# Patient Record
Sex: MALE | Race: BLACK OR AFRICAN AMERICAN | NOT HISPANIC OR LATINO | Employment: FULL TIME | URBAN - METROPOLITAN AREA
[De-identification: names, ages, dates, MRNs, and addresses within clinical notes are randomized per-mention and may not be internally consistent; named-entity substitution may affect disease eponyms.]

---

## 2017-01-30 PROBLEM — M25.562 CHRONIC PAIN OF LEFT KNEE: Status: ACTIVE | Noted: 2017-01-30

## 2017-01-30 PROBLEM — M25.552 PAIN OF LEFT HIP JOINT: Status: ACTIVE | Noted: 2017-01-30

## 2017-01-30 PROBLEM — M54.40 CHRONIC LOW BACK PAIN WITH SCIATICA: Status: ACTIVE | Noted: 2017-01-30

## 2017-01-30 PROBLEM — G89.29 CHRONIC PAIN OF LEFT KNEE: Status: ACTIVE | Noted: 2017-01-30

## 2017-01-30 PROBLEM — D86.9 SARCOIDOSIS: Status: ACTIVE | Noted: 2017-01-30

## 2017-01-30 PROBLEM — G89.29 CHRONIC LOW BACK PAIN WITH SCIATICA: Status: ACTIVE | Noted: 2017-01-30

## 2017-03-30 PROBLEM — M25.511 CHRONIC RIGHT SHOULDER PAIN: Status: ACTIVE | Noted: 2017-03-30

## 2017-03-30 PROBLEM — G89.29 CHRONIC RIGHT SHOULDER PAIN: Status: ACTIVE | Noted: 2017-03-30

## 2017-05-04 PROBLEM — J45.31 MILD PERSISTENT ASTHMA WITH ACUTE EXACERBATION: Status: ACTIVE | Noted: 2017-05-04

## 2018-10-04 ENCOUNTER — HOSPITAL ENCOUNTER (EMERGENCY)
Facility: HOSPITAL | Age: 38
Discharge: HOME | End: 2018-10-04
Attending: FAMILY MEDICINE

## 2018-10-04 VITALS
DIASTOLIC BLOOD PRESSURE: 65 MMHG | BODY MASS INDEX: 22.43 KG/M2 | SYSTOLIC BLOOD PRESSURE: 117 MMHG | TEMPERATURE: 103 F | OXYGEN SATURATION: 94 % | HEART RATE: 124 BPM | HEIGHT: 77 IN | RESPIRATION RATE: 20 BRPM | WEIGHT: 190 LBS

## 2018-10-04 DIAGNOSIS — R50.9 FEBRILE ILLNESS, ACUTE: Primary | ICD-10-CM

## 2018-10-04 DIAGNOSIS — D86.9 SARCOIDOSIS: ICD-10-CM

## 2018-10-04 LAB
FLUAV AG SPEC QL IA: NEGATIVE
FLUBV AG SPEC QL IA: NEGATIVE
SPECIMEN SOURCE: NORMAL

## 2018-10-04 PROCEDURE — 71046 X-RAY EXAM CHEST 2 VIEWS: CPT | Mod: TC,FY

## 2018-10-04 PROCEDURE — 87400 INFLUENZA A/B EACH AG IA: CPT | Mod: 59

## 2018-10-04 PROCEDURE — 63600175 PHARM REV CODE 636 W HCPCS: Performed by: FAMILY MEDICINE

## 2018-10-04 PROCEDURE — 25000003 PHARM REV CODE 250: Performed by: FAMILY MEDICINE

## 2018-10-04 PROCEDURE — 71046 X-RAY EXAM CHEST 2 VIEWS: CPT | Mod: 26,,, | Performed by: RADIOLOGY

## 2018-10-04 PROCEDURE — 99284 EMERGENCY DEPT VISIT MOD MDM: CPT | Mod: 25

## 2018-10-04 PROCEDURE — 96372 THER/PROPH/DIAG INJ SC/IM: CPT

## 2018-10-04 RX ORDER — METHYLPREDNISOLONE 4 MG/1
TABLET ORAL
Qty: 1 PACKAGE | Refills: 0 | Status: SHIPPED | OUTPATIENT
Start: 2018-10-04 | End: 2020-06-14 | Stop reason: CLARIF

## 2018-10-04 RX ORDER — DEXAMETHASONE SODIUM PHOSPHATE 100 MG/10ML
10 INJECTION INTRAMUSCULAR; INTRAVENOUS
Status: COMPLETED | OUTPATIENT
Start: 2018-10-04 | End: 2018-10-04

## 2018-10-04 RX ORDER — IBUPROFEN 400 MG/1
400 TABLET ORAL
Status: COMPLETED | OUTPATIENT
Start: 2018-10-04 | End: 2018-10-04

## 2018-10-04 RX ADMIN — IBUPROFEN 400 MG: 400 TABLET ORAL at 06:10

## 2018-10-04 RX ADMIN — DEXAMETHASONE SODIUM PHOSPHATE 10 MG: 10 INJECTION INTRAMUSCULAR; INTRAVENOUS at 07:10

## 2018-10-04 NOTE — ED PROVIDER NOTES
Encounter Date: 10/4/2018       History     Chief Complaint   Patient presents with    Cough    Nasal Congestion    Chills    Fever    Generalized Body Aches     Patient seen in room for upon my arrival in the ER at 1800, complains of cough congestion, fever, chills and body aches for the past 2 days.  Denies any nausea or vomiting.  Has history of sarcoidosis and states he has not been on steroids for several months because he has been well.          Review of patient's allergies indicates:  No Known Allergies  Past Medical History:   Diagnosis Date    Asthma     Back pain     Chronic back pain     Knee pain, left     Neck pain     Pulmonary sarcoidosis      Past Surgical History:   Procedure Laterality Date    KNEE SURGERY Left 2010    Dr. Nj     KNEE SURGERY Left     fracture     History reviewed. No pertinent family history.  Social History     Tobacco Use    Smoking status: Current Every Day Smoker     Packs/day: 1.00     Types: Cigarettes   Substance Use Topics    Alcohol use: No    Drug use: No     Review of Systems   Constitutional: Positive for fever.   HENT: Positive for congestion.    Eyes: Negative.    Respiratory: Positive for cough.    Cardiovascular: Negative.    Gastrointestinal: Negative.    Endocrine: Negative.    Genitourinary: Negative.    Musculoskeletal: Negative.    Neurological: Negative.    Hematological: Negative.    Psychiatric/Behavioral: Negative.        Physical Exam     Initial Vitals [10/04/18 1644]   BP Pulse Resp Temp SpO2   117/65 (!) 124 20 (!) 103.1 °F (39.5 °C) (!) 94 %      MAP       --         Physical Exam    Nursing note and vitals reviewed.  Constitutional: He appears well-developed and well-nourished. He is not diaphoretic. No distress.   HENT:   Head: Normocephalic and atraumatic.   Eyes: Pupils are equal, round, and reactive to light.   Neck: Normal range of motion. Neck supple.   Cardiovascular: Normal rate, regular rhythm, normal heart sounds and  intact distal pulses. Exam reveals no gallop and no friction rub.    No murmur heard.  Pulmonary/Chest: No respiratory distress. He has no wheezes. He has no rhonchi. He has no rales. He exhibits no tenderness.   No adventitious breath sounds however lungs are coarse upon examination.  No respiratory distress.   Abdominal: Soft. Bowel sounds are normal. He exhibits no distension. There is no tenderness. There is no rebound and no guarding.   Musculoskeletal: Normal range of motion. He exhibits no edema.   Neurological: He is alert and oriented to person, place, and time. He has normal strength.   Skin: Skin is warm and dry. Capillary refill takes less than 2 seconds.   Psychiatric: He has a normal mood and affect. His behavior is normal. Judgment and thought content normal.         ED Course   Procedures  Labs Reviewed   INFLUENZA A AND B ANTIGEN          Imaging Results          X-Ray Chest PA And Lateral (In process)                                    ED Course as of Oct 04 1915   u Oct 04, 2018   1912 Pt is desiring to leave, I explained to him and wife that I wouild like to run a CBC due to his negative testing and fever, he refuses, states that he will return of condition changes or worsens. He asks that I provide steroids due to the sarcoid, I explained the risks vs. Benefits a/w this, he still desires.  [MD]      ED Course User Index  [MD] Beth Patrick MD     Clinical Impression:   The primary encounter diagnosis was Febrile illness, acute. A diagnosis of Sarcoidosis was also pertinent to this visit.                             Beth Patrick MD  10/04/18 1915

## 2018-10-05 NOTE — DISCHARGE INSTRUCTIONS
Return to ER immediately if any change or worsening of condition occurs.  May take Tylenol or Motrin as needed for fever chills and aches.  Drink plenty of fluids and attempt to get plenty of rest.  Take medications as directed.  Please attempt to follow up with the primary care provider in the next 2-3 days for follow-up due to severity of symptoms and uncertainty of diagnosis.

## 2020-04-30 ENCOUNTER — HOSPITAL ENCOUNTER (EMERGENCY)
Facility: HOSPITAL | Age: 40
Discharge: HOME | End: 2020-04-30
Attending: FAMILY MEDICINE
Payer: OTHER GOVERNMENT

## 2020-04-30 VITALS
RESPIRATION RATE: 18 BRPM | WEIGHT: 191 LBS | DIASTOLIC BLOOD PRESSURE: 84 MMHG | TEMPERATURE: 99 F | HEART RATE: 85 BPM | SYSTOLIC BLOOD PRESSURE: 123 MMHG | HEIGHT: 77 IN | BODY MASS INDEX: 22.55 KG/M2 | OXYGEN SATURATION: 96 %

## 2020-04-30 DIAGNOSIS — R06.02 SOB (SHORTNESS OF BREATH): ICD-10-CM

## 2020-04-30 DIAGNOSIS — D86.9 SARCOIDOSIS: Primary | ICD-10-CM

## 2020-04-30 LAB
ANION GAP SERPL CALC-SCNC: 8 MMOL/L (ref 8–16)
BASOPHILS # BLD AUTO: 0.03 K/UL (ref 0–0.2)
BASOPHILS NFR BLD: 0.7 % (ref 0–1.9)
BUN SERPL-MCNC: 16 MG/DL (ref 6–20)
CALCIUM SERPL-MCNC: 8.9 MG/DL (ref 8.7–10.5)
CHLORIDE SERPL-SCNC: 103 MMOL/L (ref 95–110)
CO2 SERPL-SCNC: 30 MMOL/L (ref 23–29)
CREAT SERPL-MCNC: 0.9 MG/DL (ref 0.5–1.4)
DIFFERENTIAL METHOD: ABNORMAL
EOSINOPHIL # BLD AUTO: 0.3 K/UL (ref 0–0.5)
EOSINOPHIL NFR BLD: 7.4 % (ref 0–8)
ERYTHROCYTE [DISTWIDTH] IN BLOOD BY AUTOMATED COUNT: 11.7 % (ref 11.5–14.5)
EST. GFR  (AFRICAN AMERICAN): >60 ML/MIN/1.73 M^2
EST. GFR  (NON AFRICAN AMERICAN): >60 ML/MIN/1.73 M^2
GLUCOSE SERPL-MCNC: 101 MG/DL (ref 70–110)
HCT VFR BLD AUTO: 38.4 % (ref 40–54)
HGB BLD-MCNC: 13 G/DL (ref 14–18)
IMM GRANULOCYTES # BLD AUTO: 0.01 K/UL (ref 0–0.04)
IMM GRANULOCYTES NFR BLD AUTO: 0.2 % (ref 0–0.5)
LYMPHOCYTES # BLD AUTO: 1 K/UL (ref 1–4.8)
LYMPHOCYTES NFR BLD: 22.9 % (ref 18–48)
MAGNESIUM SERPL-MCNC: 1.9 MG/DL (ref 1.6–2.6)
MCH RBC QN AUTO: 32.7 PG (ref 27–31)
MCHC RBC AUTO-ENTMCNC: 33.9 G/DL (ref 32–36)
MCV RBC AUTO: 97 FL (ref 82–98)
MONOCYTES # BLD AUTO: 0.5 K/UL (ref 0.3–1)
MONOCYTES NFR BLD: 12.2 % (ref 4–15)
NEUTROPHILS # BLD AUTO: 2.4 K/UL (ref 1.8–7.7)
NEUTROPHILS NFR BLD: 56.6 % (ref 38–73)
NRBC BLD-RTO: 0 /100 WBC
PLATELET # BLD AUTO: 189 K/UL (ref 150–350)
PMV BLD AUTO: 10.1 FL (ref 9.2–12.9)
POTASSIUM SERPL-SCNC: 3.8 MMOL/L (ref 3.5–5.1)
RBC # BLD AUTO: 3.98 M/UL (ref 4.6–6.2)
SARS-COV-2 RDRP RESP QL NAA+PROBE: NEGATIVE
SODIUM SERPL-SCNC: 141 MMOL/L (ref 136–145)
TROPONIN I SERPL DL<=0.01 NG/ML-MCNC: 0.01 NG/ML (ref 0.02–0.5)
WBC # BLD AUTO: 4.19 K/UL (ref 3.9–12.7)

## 2020-04-30 PROCEDURE — 94761 N-INVAS EAR/PLS OXIMETRY MLT: CPT

## 2020-04-30 PROCEDURE — 25000242 PHARM REV CODE 250 ALT 637 W/ HCPCS: Performed by: FAMILY MEDICINE

## 2020-04-30 PROCEDURE — 63600175 PHARM REV CODE 636 W HCPCS: Performed by: FAMILY MEDICINE

## 2020-04-30 PROCEDURE — 71045 X-RAY EXAM CHEST 1 VIEW: CPT | Mod: 26,,, | Performed by: RADIOLOGY

## 2020-04-30 PROCEDURE — 99284 EMERGENCY DEPT VISIT MOD MDM: CPT | Mod: 25

## 2020-04-30 PROCEDURE — 84484 ASSAY OF TROPONIN QUANT: CPT

## 2020-04-30 PROCEDURE — 94640 AIRWAY INHALATION TREATMENT: CPT

## 2020-04-30 PROCEDURE — 83735 ASSAY OF MAGNESIUM: CPT

## 2020-04-30 PROCEDURE — 71045 X-RAY EXAM CHEST 1 VIEW: CPT | Mod: TC,FY

## 2020-04-30 PROCEDURE — 71045 XR CHEST AP PORTABLE: ICD-10-PCS | Mod: 26,,, | Performed by: RADIOLOGY

## 2020-04-30 PROCEDURE — 96374 THER/PROPH/DIAG INJ IV PUSH: CPT

## 2020-04-30 PROCEDURE — 85025 COMPLETE CBC W/AUTO DIFF WBC: CPT

## 2020-04-30 PROCEDURE — 96375 TX/PRO/DX INJ NEW DRUG ADDON: CPT

## 2020-04-30 PROCEDURE — 80048 BASIC METABOLIC PNL TOTAL CA: CPT

## 2020-04-30 PROCEDURE — U0002 COVID-19 LAB TEST NON-CDC: HCPCS

## 2020-04-30 RX ORDER — MULTIVITAMIN
1 TABLET ORAL DAILY
COMMUNITY

## 2020-04-30 RX ORDER — ALBUTEROL SULFATE 0.63 MG/3ML
0.63 SOLUTION RESPIRATORY (INHALATION) EVERY 6 HOURS PRN
COMMUNITY

## 2020-04-30 RX ORDER — IPRATROPIUM BROMIDE AND ALBUTEROL SULFATE 2.5; .5 MG/3ML; MG/3ML
6 SOLUTION RESPIRATORY (INHALATION)
Status: COMPLETED | OUTPATIENT
Start: 2020-04-30 | End: 2020-04-30

## 2020-04-30 RX ORDER — ONDANSETRON 2 MG/ML
4 INJECTION INTRAMUSCULAR; INTRAVENOUS
Status: COMPLETED | OUTPATIENT
Start: 2020-04-30 | End: 2020-04-30

## 2020-04-30 RX ORDER — PREDNISONE 10 MG/1
20 TABLET ORAL DAILY
Qty: 60 TABLET | Refills: 0 | Status: SHIPPED | OUTPATIENT
Start: 2020-04-30 | End: 2020-06-29

## 2020-04-30 RX ORDER — METHYLPREDNISOLONE SOD SUCC 125 MG
125 VIAL (EA) INJECTION
Status: COMPLETED | OUTPATIENT
Start: 2020-04-30 | End: 2020-04-30

## 2020-04-30 RX ADMIN — IPRATROPIUM BROMIDE AND ALBUTEROL SULFATE 6 ML: .5; 3 SOLUTION RESPIRATORY (INHALATION) at 06:04

## 2020-04-30 RX ADMIN — ONDANSETRON 4 MG: 2 INJECTION INTRAMUSCULAR; INTRAVENOUS at 07:04

## 2020-04-30 RX ADMIN — METHYLPREDNISOLONE SODIUM SUCCINATE 125 MG: 125 INJECTION, POWDER, FOR SOLUTION INTRAMUSCULAR; INTRAVENOUS at 06:04

## 2020-04-30 NOTE — ED NOTES
Pt here with c/o SOB/cough. Reports a history of sarcoidosis of the lungs and asthma. States he has been SOB x2 weeks and was on prednisone x7 days without improvement. Pt states he has been trying to see his PCP but is unable to at this time.

## 2020-04-30 NOTE — ED PROVIDER NOTES
Encounter Date: 4/30/2020       History     Chief Complaint   Patient presents with    Shortness of Breath     Patient comes our facility complaining of shortness of breath that started this morning.  Patient has a history of asthma as well as sarcoidosis.  He does continue to smoke daily as well.  Patient states that his sarcoidosis has affected his lungs.  He gets these flare-ups often.  He claims that he had just finished a course of prednisone over 7 days, completing the course about 4 5 days earlier.  He stated that he had similar shortness of breath in the the steroids did help.  The patient did not bother to finish 1 breathing treatment this morning before coming to our facility with the complaints of shortness of breath and wheezing.  He does have a chronic cough that has clear sputum production.  He does have seasonal allergies that he takes Singulair and Zyrtec for.  He denies any fever, chills or sick contacts.        Review of patient's allergies indicates:  No Known Allergies  Past Medical History:   Diagnosis Date    Asthma     Sarcoidosis of lung      Past Surgical History:   Procedure Laterality Date    KNEE SURGERY       History reviewed. No pertinent family history.  Social History     Tobacco Use    Smoking status: Current Every Day Smoker     Packs/day: 0.50     Types: Cigarettes   Substance Use Topics    Alcohol use: Not Currently    Drug use: Not on file     Review of Systems   Constitutional: Negative for chills and fever.   HENT: Negative for sore throat.    Respiratory: Positive for cough, shortness of breath and wheezing. Negative for stridor.    Cardiovascular: Negative for chest pain and palpitations.   Gastrointestinal: Negative for abdominal pain, nausea and vomiting.   Genitourinary: Negative for dysuria.   Musculoskeletal: Negative for arthralgias and myalgias.   Skin: Negative for color change, pallor, rash and wound.   Neurological: Negative for dizziness, weakness,  light-headedness and headaches.   Hematological: Negative for adenopathy. Does not bruise/bleed easily.   Psychiatric/Behavioral: Negative for agitation, behavioral problems and confusion.       Physical Exam     Initial Vitals [04/30/20 0516]   BP Pulse Resp Temp SpO2   123/84 88 20 98.5 °F (36.9 °C) 96 %      MAP       --         Physical Exam    Nursing note and vitals reviewed.  Constitutional: He appears well-developed and well-nourished. He is not diaphoretic.  Non-toxic appearance. He does not appear ill. No distress. He is not intubated.   HENT:   Head: Normocephalic and atraumatic.   Mouth/Throat: No oropharyngeal exudate or posterior oropharyngeal edema.   Eyes: EOM are normal.   Neck: Normal range of motion. Neck supple. No thyromegaly present.   Cardiovascular: Normal rate, regular rhythm, normal heart sounds and intact distal pulses.   No murmur heard.  Pulmonary/Chest: Effort normal. No accessory muscle usage or stridor. No apnea, no tachypnea and no bradypnea. He is not intubated. No respiratory distress. He has no decreased breath sounds. He has wheezes in the right upper field, the right middle field, the right lower field, the left upper field, the left middle field and the left lower field. He has no rhonchi. He has no rales. Chest wall is not dull to percussion. He exhibits no mass, no tenderness, no bony tenderness, no deformity and no swelling.   Abdominal: Soft. Bowel sounds are normal. He exhibits no distension and no mass. There is no tenderness. There is no guarding.   Musculoskeletal: Normal range of motion.        Right lower leg: Normal. He exhibits no tenderness and no edema.        Left lower leg: Normal. He exhibits no tenderness and no edema.   Lymphadenopathy:     He has no cervical adenopathy.   Neurological: He is alert and oriented to person, place, and time. He is not disoriented. No cranial nerve deficit.   Skin: Skin is warm and dry. Capillary refill takes less than 2 seconds.  No ecchymosis and no rash noted. No cyanosis or erythema. No pallor. Nails show no clubbing.   Psychiatric: He has a normal mood and affect. His behavior is normal. His mood appears not anxious. He is not agitated.         ED Course   Procedures  Labs Reviewed - No data to display       Imaging Results    None                                      Patient was transitioned to Dr Pratt at 06:10  Please see Dr Pratt's note.    Clinical Impression:       ICD-10-CM ICD-9-CM   1. Sarcoidosis D86.9 135   2. SOB (shortness of breath) R06.02 786.05                                Bogdan Grey MD  05/22/20 0618

## 2020-04-30 NOTE — ED PROVIDER NOTES
Encounter Date: 4/30/2020       History     Chief Complaint   Patient presents with    Shortness of Breath     39-year-old male presents complaining of moderate shortness of breath he has history of sarcoidosis and is new to our facility his pulmonary physician practices near [facility] no history of hemoptysis fever chills sore throat no nausea vomiting or diarrhea        Review of patient's allergies indicates:  No Known Allergies  Past Medical History:   Diagnosis Date    Asthma     Sarcoidosis of lung      Past Surgical History:   Procedure Laterality Date    KNEE SURGERY       History reviewed. No pertinent family history.  Social History     Tobacco Use    Smoking status: Current Every Day Smoker     Packs/day: 0.50     Types: Cigarettes   Substance Use Topics    Alcohol use: Not Currently    Drug use: Not on file     Review of Systems   Constitutional: Negative for fever.   HENT: Negative for sore throat.    Respiratory: Positive for shortness of breath. Negative for cough and wheezing.    Cardiovascular: Negative for chest pain.   Gastrointestinal: Negative for nausea.   Genitourinary: Negative for dysuria.   Musculoskeletal: Negative for back pain.   Skin: Negative for rash.   Neurological: Negative for weakness.   Hematological: Does not bruise/bleed easily.       Physical Exam     Initial Vitals [04/30/20 0516]   BP Pulse Resp Temp SpO2   123/84 88 20 98.5 °F (36.9 °C) 96 %      MAP       --         Physical Exam    Nursing note and vitals reviewed.  Constitutional: He appears well-developed and well-nourished. He is not diaphoretic. No distress.   HENT:   Head: Normocephalic and atraumatic.   Right Ear: External ear normal.   Left Ear: External ear normal.   Nose: Nose normal.   Mouth/Throat: Oropharynx is clear and moist. No oropharyngeal exudate.   Eyes: EOM are normal.   Neck: Normal range of motion. Neck supple. No tracheal deviation present.   Cardiovascular: Normal rate and regular  rhythm.   No murmur heard.  Pulmonary/Chest: Breath sounds normal. No stridor. No respiratory distress. He has no rales.   Abdominal: Soft. He exhibits no distension and no mass. There is no tenderness. There is no rebound.   Musculoskeletal: Normal range of motion. He exhibits no edema.   Lymphadenopathy:     He has no cervical adenopathy.   Neurological: He is alert and oriented to person, place, and time. He has normal strength.   Skin: Skin is warm and dry. Capillary refill takes less than 2 seconds. No pallor.   Psychiatric: He has a normal mood and affect.         ED Course   Procedures  Labs Reviewed   CBC W/ AUTO DIFFERENTIAL - Abnormal; Notable for the following components:       Result Value    RBC 3.98 (*)     Hemoglobin 13.0 (*)     Hematocrit 38.4 (*)     Mean Corpuscular Hemoglobin 32.7 (*)     All other components within normal limits   BASIC METABOLIC PANEL - Abnormal; Notable for the following components:    CO2 30 (*)     All other components within normal limits   TROPONIN I - Abnormal; Notable for the following components:    Troponin I 0.01 (*)     All other components within normal limits   MAGNESIUM   SARS-COV-2 RNA AMPLIFICATION, QUAL    Narrative:     What symptom criteria does the patient meet?->Cough  What symptom criteria does the patient meet?->Shortness of  breath or difficulty breathing          Imaging Results          X-Ray Chest AP Portable (Final result)  Result time 04/30/20 09:03:44    Final result by Charisse Belcher MD (04/30/20 09:03:44)                 Impression:      Mild prominence of the narciso, perhaps the result of hilar adenopathy due to the reported history of sarcoidosis.  Otherwise normal chest x-ray.      Electronically signed by: Charisse Belcher  Date:    04/30/2020  Time:    09:03             Narrative:    EXAMINATION:  XR CHEST AP PORTABLE    CLINICAL HISTORY:  Shortness of breath, history of sarcoidosis and asthma    TECHNIQUE:  Single frontal view of the  chest was performed.    COMPARISON:  None    FINDINGS:  AP portable upright view of the chest demonstrate deeply inflated but clear lungs.  The heart size is within normal limits.  Mild prominence of the narciso may be on the basis of sarcoidosis.  There is no pleural effusion or pneumothorax.  No acute bony abnormality.                                                                 Clinical Impression:       ICD-10-CM ICD-9-CM   1. Sarcoidosis D86.9 135   2. SOB (shortness of breath) R06.02 786.05             ED Disposition Condition    Discharge Stable        ED Prescriptions     Medication Sig Dispense Start Date End Date Auth. Provider    predniSONE (DELTASONE) 10 MG tablet Take 2 tablets (20 mg total) by mouth once daily. Take 4 tabs x 3 days, then  Take 2 tabs x 3 days, then   Take 1 tab x 3 days. 60 tablet 4/30/2020 6/29/2020 Presley Pratt MD        Follow-up Information    None                                    Presley Pratt MD  04/30/20 4867

## 2020-06-14 ENCOUNTER — HOSPITAL ENCOUNTER (EMERGENCY)
Facility: HOSPITAL | Age: 40
Discharge: HOME | End: 2020-06-14
Attending: EMERGENCY MEDICINE
Payer: COMMERCIAL

## 2020-06-14 VITALS
TEMPERATURE: 99 F | RESPIRATION RATE: 12 BRPM | OXYGEN SATURATION: 99 % | DIASTOLIC BLOOD PRESSURE: 78 MMHG | WEIGHT: 190 LBS | HEIGHT: 77 IN | BODY MASS INDEX: 22.43 KG/M2 | HEART RATE: 80 BPM | SYSTOLIC BLOOD PRESSURE: 133 MMHG

## 2020-06-14 DIAGNOSIS — D86.9 SARCOIDOSIS: Primary | ICD-10-CM

## 2020-06-14 DIAGNOSIS — R06.00 DYSPNEA: ICD-10-CM

## 2020-06-14 LAB
ALBUMIN SERPL BCP-MCNC: 4.3 G/DL (ref 3.5–5.2)
ALLENS TEST: ABNORMAL
ALP SERPL-CCNC: 97 U/L (ref 55–135)
ALT SERPL W/O P-5'-P-CCNC: 79 U/L (ref 10–44)
ANION GAP SERPL CALC-SCNC: 10 MMOL/L (ref 8–16)
AST SERPL-CCNC: 112 U/L (ref 10–40)
BASOPHILS # BLD AUTO: 0.04 K/UL (ref 0–0.2)
BASOPHILS NFR BLD: 0.8 % (ref 0–1.9)
BILIRUB SERPL-MCNC: 0.4 MG/DL (ref 0.1–1)
BUN SERPL-MCNC: 13 MG/DL (ref 6–20)
CALCIUM SERPL-MCNC: 9.1 MG/DL (ref 8.7–10.5)
CHLORIDE SERPL-SCNC: 103 MMOL/L (ref 95–110)
CO2 SERPL-SCNC: 30 MMOL/L (ref 23–29)
CREAT SERPL-MCNC: 0.9 MG/DL (ref 0.5–1.4)
DELSYS: ABNORMAL
DIFFERENTIAL METHOD: ABNORMAL
EOSINOPHIL # BLD AUTO: 0.3 K/UL (ref 0–0.5)
EOSINOPHIL NFR BLD: 4.8 % (ref 0–8)
ERYTHROCYTE [DISTWIDTH] IN BLOOD BY AUTOMATED COUNT: 11.9 % (ref 11.5–14.5)
EST. GFR  (AFRICAN AMERICAN): >60 ML/MIN/1.73 M^2
EST. GFR  (NON AFRICAN AMERICAN): >60 ML/MIN/1.73 M^2
FIO2: 28
FLOW: 2
GLUCOSE SERPL-MCNC: 88 MG/DL (ref 70–110)
HCO3 UR-SCNC: 32.3 MMOL/L (ref 24–28)
HCT VFR BLD AUTO: 37.4 % (ref 40–54)
HGB BLD-MCNC: 12.8 G/DL (ref 14–18)
IMM GRANULOCYTES # BLD AUTO: 0.01 K/UL (ref 0–0.04)
IMM GRANULOCYTES NFR BLD AUTO: 0.2 % (ref 0–0.5)
LYMPHOCYTES # BLD AUTO: 1.1 K/UL (ref 1–4.8)
LYMPHOCYTES NFR BLD: 20.9 % (ref 18–48)
MCH RBC QN AUTO: 32.7 PG (ref 27–31)
MCHC RBC AUTO-ENTMCNC: 34.2 G/DL (ref 32–36)
MCV RBC AUTO: 95 FL (ref 82–98)
MODE: ABNORMAL
MONOCYTES # BLD AUTO: 0.5 K/UL (ref 0.3–1)
MONOCYTES NFR BLD: 9.5 % (ref 4–15)
NEUTROPHILS # BLD AUTO: 3.4 K/UL (ref 1.8–7.7)
NEUTROPHILS NFR BLD: 63.8 % (ref 38–73)
NRBC BLD-RTO: 0 /100 WBC
PCO2 BLDA: 60.9 MMHG (ref 35–45)
PH SMN: 7.33 [PH] (ref 7.35–7.45)
PLATELET # BLD AUTO: 212 K/UL (ref 150–350)
PMV BLD AUTO: 10.3 FL (ref 9.2–12.9)
PO2 BLDA: 98 MMHG (ref 80–100)
POC BE: 6 MMOL/L
POC SATURATED O2: 97 % (ref 95–100)
POC TCO2: 34 MMOL/L (ref 23–27)
POTASSIUM SERPL-SCNC: 4.1 MMOL/L (ref 3.5–5.1)
PROT SERPL-MCNC: 7.4 G/DL (ref 6–8.4)
RBC # BLD AUTO: 3.92 M/UL (ref 4.6–6.2)
SAMPLE: ABNORMAL
SITE: ABNORMAL
SODIUM SERPL-SCNC: 143 MMOL/L (ref 136–145)
SP02: 96
WBC # BLD AUTO: 5.26 K/UL (ref 3.9–12.7)

## 2020-06-14 PROCEDURE — 25000242 PHARM REV CODE 250 ALT 637 W/ HCPCS: Performed by: EMERGENCY MEDICINE

## 2020-06-14 PROCEDURE — 25000003 PHARM REV CODE 250: Performed by: EMERGENCY MEDICINE

## 2020-06-14 PROCEDURE — 96375 TX/PRO/DX INJ NEW DRUG ADDON: CPT

## 2020-06-14 PROCEDURE — 94761 N-INVAS EAR/PLS OXIMETRY MLT: CPT

## 2020-06-14 PROCEDURE — 94644 CONT INHLJ TX 1ST HOUR: CPT

## 2020-06-14 PROCEDURE — 96361 HYDRATE IV INFUSION ADD-ON: CPT

## 2020-06-14 PROCEDURE — 96374 THER/PROPH/DIAG INJ IV PUSH: CPT

## 2020-06-14 PROCEDURE — 99284 EMERGENCY DEPT VISIT MOD MDM: CPT | Mod: 25

## 2020-06-14 PROCEDURE — 63600175 PHARM REV CODE 636 W HCPCS: Performed by: EMERGENCY MEDICINE

## 2020-06-14 PROCEDURE — 82803 BLOOD GASES ANY COMBINATION: CPT

## 2020-06-14 PROCEDURE — 80053 COMPREHEN METABOLIC PANEL: CPT

## 2020-06-14 PROCEDURE — 99900035 HC TECH TIME PER 15 MIN (STAT)

## 2020-06-14 PROCEDURE — 71045 X-RAY EXAM CHEST 1 VIEW: CPT | Mod: 26,,, | Performed by: RADIOLOGY

## 2020-06-14 PROCEDURE — 94640 AIRWAY INHALATION TREATMENT: CPT

## 2020-06-14 PROCEDURE — 27000221 HC OXYGEN, UP TO 24 HOURS

## 2020-06-14 PROCEDURE — 71045 X-RAY EXAM CHEST 1 VIEW: CPT | Mod: TC,FY

## 2020-06-14 PROCEDURE — 36600 WITHDRAWAL OF ARTERIAL BLOOD: CPT

## 2020-06-14 PROCEDURE — 85025 COMPLETE CBC W/AUTO DIFF WBC: CPT

## 2020-06-14 PROCEDURE — 71045 XR CHEST AP PORTABLE: ICD-10-PCS | Mod: 26,,, | Performed by: RADIOLOGY

## 2020-06-14 RX ORDER — ALBUTEROL SULFATE 0.83 MG/ML
10 SOLUTION RESPIRATORY (INHALATION) ONCE
Status: COMPLETED | OUTPATIENT
Start: 2020-06-14 | End: 2020-06-14

## 2020-06-14 RX ORDER — ONDANSETRON 2 MG/ML
4 INJECTION INTRAMUSCULAR; INTRAVENOUS
Status: COMPLETED | OUTPATIENT
Start: 2020-06-14 | End: 2020-06-14

## 2020-06-14 RX ORDER — HYDROCODONE BITARTRATE AND ACETAMINOPHEN 5; 325 MG/1; MG/1
1 TABLET ORAL
Status: DISCONTINUED | OUTPATIENT
Start: 2020-06-14 | End: 2020-06-14 | Stop reason: HOSPADM

## 2020-06-14 RX ORDER — PREDNISONE 20 MG/1
40 TABLET ORAL DAILY
Qty: 10 TABLET | Refills: 0 | Status: SHIPPED | OUTPATIENT
Start: 2020-06-14 | End: 2020-06-19

## 2020-06-14 RX ORDER — IPRATROPIUM BROMIDE AND ALBUTEROL SULFATE 2.5; .5 MG/3ML; MG/3ML
3 SOLUTION RESPIRATORY (INHALATION)
Status: COMPLETED | OUTPATIENT
Start: 2020-06-14 | End: 2020-06-14

## 2020-06-14 RX ORDER — METHYLPREDNISOLONE SOD SUCC 125 MG
125 VIAL (EA) INJECTION
Status: COMPLETED | OUTPATIENT
Start: 2020-06-14 | End: 2020-06-14

## 2020-06-14 RX ADMIN — ALBUTEROL SULFATE 10 MG: 2.5 SOLUTION RESPIRATORY (INHALATION) at 05:06

## 2020-06-14 RX ADMIN — SODIUM CHLORIDE 500 ML: 0.9 INJECTION, SOLUTION INTRAVENOUS at 05:06

## 2020-06-14 RX ADMIN — IPRATROPIUM BROMIDE AND ALBUTEROL SULFATE 3 ML: .5; 3 SOLUTION RESPIRATORY (INHALATION) at 05:06

## 2020-06-14 RX ADMIN — METHYLPREDNISOLONE SODIUM SUCCINATE 125 MG: 125 INJECTION, POWDER, FOR SOLUTION INTRAMUSCULAR; INTRAVENOUS at 05:06

## 2020-06-14 RX ADMIN — ONDANSETRON 4 MG: 2 INJECTION INTRAMUSCULAR; INTRAVENOUS at 05:06

## 2020-06-14 NOTE — ED PROVIDER NOTES
Encounter Date: 6/14/2020       History   No chief complaint on file.    Mr De reports shortness of breath and no relief with home meds. Pt able to give little history due to acuity. Sitting up in room in moderate distress. Hx of sarcoidosis.     The history is provided by the patient.     Review of patient's allergies indicates:  No Known Allergies  Past Medical History:   Diagnosis Date    Asthma     Back pain     Chronic back pain     Knee pain, left     Neck pain     Pulmonary sarcoidosis      Past Surgical History:   Procedure Laterality Date    KNEE SURGERY Left 2010    Dr. Nj     KNEE SURGERY Left     fracture     History reviewed. No pertinent family history.  Social History     Tobacco Use    Smoking status: Current Every Day Smoker     Packs/day: 1.00     Types: Cigarettes   Substance Use Topics    Alcohol use: No    Drug use: No     Review of Systems   Unable to perform ROS: Acuity of condition (unable to obtain full ros)   Respiratory: Positive for shortness of breath and wheezing.    All other systems reviewed and are negative.      Physical Exam     Initial Vitals [06/14/20 1714]   BP Pulse Resp Temp SpO2   124/83 104 20 99.1 °F (37.3 °C) 95 %      MAP       --         Physical Exam    Nursing note and vitals reviewed.  Constitutional: He appears well-developed and well-nourished. He is not diaphoretic. No distress.   Sitting up. Calm. Able to speak short sentences. In moderate distress. stoic   HENT:   Head: Normocephalic and atraumatic.   Right Ear: External ear normal.   Left Ear: External ear normal.   Nose: Nose normal.   Mouth/Throat: Oropharynx is clear and moist.   Eyes: Conjunctivae and EOM are normal. Pupils are equal, round, and reactive to light. No scleral icterus.   Neck: Normal range of motion. Neck supple. No JVD present.   Cardiovascular: Normal rate, regular rhythm, normal heart sounds and intact distal pulses.   Pulmonary/Chest: He is in respiratory distress. He  has wheezes. He has no rhonchi. He has no rales. He exhibits no tenderness.   Moderate distress. Prolonged expiratory phase. Diminished breath sounds throughout.    Abdominal: Soft. Bowel sounds are normal. He exhibits no distension. There is no abdominal tenderness. There is no rebound and no guarding.   Musculoskeletal: Normal range of motion. No tenderness or edema.      Comments: Good tone, sitting up   Lymphadenopathy:     He has no cervical adenopathy.   Neurological: He is alert and oriented to person, place, and time. GCS score is 15. GCS eye subscore is 4. GCS verbal subscore is 5. GCS motor subscore is 6.   Skin: Skin is warm and dry. Capillary refill takes less than 2 seconds. No rash and no abscess noted. No erythema. No pallor.   Psychiatric: He has a normal mood and affect. His behavior is normal. Judgment and thought content normal.         ED Course   Procedures  Labs Reviewed   CBC W/ AUTO DIFFERENTIAL - Abnormal; Notable for the following components:       Result Value    RBC 3.92 (*)     Hemoglobin 12.8 (*)     Hematocrit 37.4 (*)     Mean Corpuscular Hemoglobin 32.7 (*)     All other components within normal limits   COMPREHENSIVE METABOLIC PANEL - Abnormal; Notable for the following components:    CO2 30 (*)      (*)     ALT 79 (*)     All other components within normal limits   ISTAT PROCEDURE - Abnormal; Notable for the following components:    POC PH 7.333 (*)     POC PCO2 60.9 (*)     POC HCO3 32.3 (*)     POC TCO2 34 (*)     All other components within normal limits          Imaging Results          X-Ray Chest AP Portable (In process)                  Medical Decision Making:   Differential Diagnosis:   Acute respiratory distress, sarcoidosis, acute bronchitis, pneumonia, viral syndrome  ED Management:  Pt signed out at change of shift to Dr. Hendrix for further care and disposition and we spoke with pt at bedside together. He is getting continuous neb treatment, has received  solumedrol, abg obtained. Jaguar Gaviria MD, 06/14/2020 6:10 PM    1930:  Significant improvement with continuous neb treatment.  Patient now on room air ambulatory without difficulty.                                 Clinical Impression:       ICD-10-CM ICD-9-CM   1. Sarcoidosis  D86.9 135   2. Dyspnea  R06.00 786.09         Disposition:   Disposition: Discharged  Condition: Stable                        Angelika Hendrix MD  06/15/20 0126

## 2020-08-10 ENCOUNTER — HOSPITAL ENCOUNTER (EMERGENCY)
Facility: HOSPITAL | Age: 40
Discharge: HOME | End: 2020-08-10
Attending: EMERGENCY MEDICINE
Payer: COMMERCIAL

## 2020-08-10 VITALS
WEIGHT: 180 LBS | TEMPERATURE: 99 F | BODY MASS INDEX: 21.25 KG/M2 | HEART RATE: 78 BPM | OXYGEN SATURATION: 98 % | DIASTOLIC BLOOD PRESSURE: 90 MMHG | RESPIRATION RATE: 20 BRPM | HEIGHT: 77 IN | SYSTOLIC BLOOD PRESSURE: 133 MMHG

## 2020-08-10 DIAGNOSIS — Z71.1 FEARED COMPLAINT WITHOUT DIAGNOSIS: ICD-10-CM

## 2020-08-10 DIAGNOSIS — Z00.00 NORMAL PHYSICAL EXAM: Primary | ICD-10-CM

## 2020-08-10 PROCEDURE — 99281 EMR DPT VST MAYX REQ PHY/QHP: CPT

## 2020-08-10 NOTE — DISCHARGE INSTRUCTIONS
Download the [employer] lex to access your health records & test results.  Please remember that you had a visit to the emergency room today and this does not substitute as primary care services for ongoing management because emergency services is a snap shot in time.  Should you have any worsening condition that requires emergency services do not hesitate to return to the ER.

## 2020-08-10 NOTE — ED PROVIDER NOTES
Encounter Date: 8/10/2020       History     Chief Complaint   Patient presents with    Fever     Patient thinks he has a fever, 98.6 in triage.     40-year-old male presents to ER with concerns of subjective fever; patient states he felt warm earlier today & his wife told him that he needed to be examined -- patient denies any acute complaints otherwise    Past medical/surgical history, allergies & current medications reviewed with patient    Known SARS-CoV2 exposure:  No    The history is provided by the patient. No  was used.     Review of patient's allergies indicates:  No Known Allergies  Past Medical History:   Diagnosis Date    Asthma     Back pain     Chronic back pain     Knee pain, left     Neck pain     Pulmonary sarcoidosis      Past Surgical History:   Procedure Laterality Date    KNEE SURGERY Left 2010    Dr. Nj     KNEE SURGERY Left     fracture     History reviewed. No pertinent family history.  Social History     Tobacco Use    Smoking status: Current Every Day Smoker     Packs/day: 1.00     Types: Cigarettes   Substance Use Topics    Alcohol use: No    Drug use: No     Review of Systems   Constitutional: Positive for fever.   HENT: Negative for sore throat.    Respiratory: Negative for shortness of breath.    Cardiovascular: Negative for chest pain.   Gastrointestinal: Negative for nausea.   Genitourinary: Negative for dysuria.   Musculoskeletal: Negative for back pain.   Skin: Negative for rash.   Neurological: Negative for weakness.   Hematological: Does not bruise/bleed easily.   All other systems reviewed and are negative.      Physical Exam     Initial Vitals [08/10/20 1729]   BP Pulse Resp Temp SpO2   (!) 133/90 78 20 98.6 °F (37 °C) 98 %      MAP       --         Physical Exam    Nursing note and vitals reviewed.  Constitutional: He appears well-developed. He does not appear ill. No distress.   Afebrile, vital signs stable   HENT:   Head: Normocephalic and  atraumatic.   Right Ear: External ear normal.   Left Ear: External ear normal.   Nose: Nose normal.   Eyes: Lids are normal.   Neck: Neck supple.   Cardiovascular: Normal rate.   Pulmonary/Chest: Effort normal. No respiratory distress.   Abdominal: He exhibits no distension.   Neurological: He is alert.   Skin: No rash noted.   Psychiatric: He has a normal mood and affect.         ED Course   Procedures  Labs Reviewed - No data to display       Imaging Results    None          Medical Decision Making:   ED Management:  Exam is unimpressive    Findings, diagnosis & plan of care discussed with patient:  Normal physical exam, feared complaint without diagnosis; care instructions given -- further instructions given to follow-up with PCP for any further concerns    All questions answered, strict return precautions given, patient verbalized understanding to all instructions, pleasant visit -- vital signs are stable & patient is in no distress at discharge    Disclaimer:  This note was prepared with [employer] Naturally Speaking voice recognition transcription software. Garbled syntax, mangled pronouns, and other bizarre constructions may be attributed to that software system.                                   Clinical Impression:       ICD-10-CM ICD-9-CM   1. Normal physical exam  Z00.00 V70.9   2. Feared complaint without diagnosis  Z71.1 V65.5             ED Disposition Condition    Discharge Stable        ED Prescriptions     None        Follow-up Information     Follow up With Specialties Details Why Contact Info    Maddy Horan III, MD Internal Medicine, Cardiology Go to  For any further concerns [address]  [place] [address]  [phone]                                       Carroll Adames NP  08/10/20 4638

## 2021-04-16 ENCOUNTER — IMMUNIZATION (OUTPATIENT)
Dept: FAMILY MEDICINE | Facility: CLINIC | Age: 41
End: 2021-04-16
Payer: COMMERCIAL

## 2021-04-16 DIAGNOSIS — Z23 NEED FOR VACCINATION: Primary | ICD-10-CM

## 2021-04-16 PROCEDURE — 0011A COVID-19, MRNA, LNP-S, PF, 100 MCG/0.5 ML DOSE VACCINE: ICD-10-PCS | Mod: CV19,S$GLB,, | Performed by: FAMILY MEDICINE

## 2021-04-16 PROCEDURE — 91301 COVID-19, MRNA, LNP-S, PF, 100 MCG/0.5 ML DOSE VACCINE: ICD-10-PCS | Mod: S$GLB,,, | Performed by: FAMILY MEDICINE

## 2021-04-16 PROCEDURE — 0011A COVID-19, MRNA, LNP-S, PF, 100 MCG/0.5 ML DOSE VACCINE: CPT | Mod: CV19,S$GLB,, | Performed by: FAMILY MEDICINE

## 2021-04-16 PROCEDURE — 91301 COVID-19, MRNA, LNP-S, PF, 100 MCG/0.5 ML DOSE VACCINE: CPT | Mod: S$GLB,,, | Performed by: FAMILY MEDICINE

## 2021-05-14 ENCOUNTER — IMMUNIZATION (OUTPATIENT)
Dept: FAMILY MEDICINE | Facility: CLINIC | Age: 41
End: 2021-05-14

## 2021-05-14 DIAGNOSIS — Z23 NEED FOR VACCINATION: Primary | ICD-10-CM

## 2021-05-14 PROCEDURE — 91301 COVID-19, MRNA, LNP-S, PF, 100 MCG/0.5 ML DOSE VACCINE: CPT | Mod: S$GLB,,, | Performed by: FAMILY MEDICINE

## 2021-05-14 PROCEDURE — 91301 COVID-19, MRNA, LNP-S, PF, 100 MCG/0.5 ML DOSE VACCINE: ICD-10-PCS | Mod: S$GLB,,, | Performed by: FAMILY MEDICINE

## 2021-05-14 PROCEDURE — 0012A COVID-19, MRNA, LNP-S, PF, 100 MCG/0.5 ML DOSE VACCINE: CPT | Mod: CV19,S$GLB,, | Performed by: FAMILY MEDICINE

## 2021-05-14 PROCEDURE — 0012A COVID-19, MRNA, LNP-S, PF, 100 MCG/0.5 ML DOSE VACCINE: ICD-10-PCS | Mod: CV19,S$GLB,, | Performed by: FAMILY MEDICINE

## 2022-03-01 ENCOUNTER — HOSPITAL ENCOUNTER (EMERGENCY)
Facility: HOSPITAL | Age: 42
Discharge: HOME | End: 2022-03-01
Attending: FAMILY MEDICINE
Payer: COMMERCIAL

## 2022-03-01 VITALS
HEART RATE: 73 BPM | DIASTOLIC BLOOD PRESSURE: 86 MMHG | OXYGEN SATURATION: 97 % | SYSTOLIC BLOOD PRESSURE: 146 MMHG | HEIGHT: 78 IN | BODY MASS INDEX: 23.14 KG/M2 | RESPIRATION RATE: 18 BRPM | WEIGHT: 200 LBS | TEMPERATURE: 98 F

## 2022-03-01 DIAGNOSIS — D86.9 SARCOIDOSIS: ICD-10-CM

## 2022-03-01 DIAGNOSIS — R51.9 NONINTRACTABLE HEADACHE, UNSPECIFIED CHRONICITY PATTERN, UNSPECIFIED HEADACHE TYPE: Primary | ICD-10-CM

## 2022-03-01 PROCEDURE — 99284 EMERGENCY DEPT VISIT MOD MDM: CPT | Mod: 25

## 2022-03-01 PROCEDURE — 96372 THER/PROPH/DIAG INJ SC/IM: CPT

## 2022-03-01 PROCEDURE — 96374 THER/PROPH/DIAG INJ IV PUSH: CPT

## 2022-03-01 PROCEDURE — 63600175 PHARM REV CODE 636 W HCPCS: Performed by: FAMILY MEDICINE

## 2022-03-01 RX ORDER — PREDNISONE 10 MG/1
20 TABLET ORAL DAILY
Qty: 60 TABLET | Refills: 0 | Status: SHIPPED | OUTPATIENT
Start: 2022-03-01

## 2022-03-01 RX ORDER — KETOROLAC TROMETHAMINE 30 MG/ML
60 INJECTION, SOLUTION INTRAMUSCULAR; INTRAVENOUS
Status: COMPLETED | OUTPATIENT
Start: 2022-03-01 | End: 2022-03-01

## 2022-03-01 RX ORDER — DEXAMETHASONE SODIUM PHOSPHATE 10 MG/ML
10 INJECTION INTRAMUSCULAR; INTRAVENOUS
Status: COMPLETED | OUTPATIENT
Start: 2022-03-01 | End: 2022-03-01

## 2022-03-01 RX ADMIN — DEXAMETHASONE SODIUM PHOSPHATE 10 MG: 10 INJECTION, SOLUTION INTRAMUSCULAR; INTRAVENOUS at 08:03

## 2022-03-01 RX ADMIN — KETOROLAC TROMETHAMINE 60 MG: 30 INJECTION, SOLUTION INTRAMUSCULAR at 08:03

## 2022-03-03 NOTE — ED PROVIDER NOTES
Encounter Date: 3/1/2022       History     Chief Complaint   Patient presents with    Headache     3 weeks. C/o nausea and vomiting. Pt reports vomiting 3 times today. Pt has history of pulmonary scardosis and reports using his albuterol inhaler more frequently lately because he states it feels like it is flared up.       41-year-old male presents the ED complaining of headache for the past 3 weeks he is complaining of some mild nausea vomiting vomited 3 times today he has positive photophobia and a history of sarcoidosis, he has taken steroids in the past with good effect but currently has no steroids, he is requesting be placed back on low-dose steroids past medical history is positive for asthma back pain knee pain and neck pain        Review of patient's allergies indicates:  No Known Allergies  Past Medical History:   Diagnosis Date    Asthma     Back pain     Chronic back pain     Knee pain, left     Neck pain     Pulmonary sarcoidosis      Past Surgical History:   Procedure Laterality Date    KNEE SURGERY Left 2010    Dr. Nj     KNEE SURGERY Left     fracture     History reviewed. No pertinent family history.  Social History     Tobacco Use    Smoking status: Current Every Day Smoker     Packs/day: 1.00     Types: Cigarettes    Smokeless tobacco: Never Used   Substance Use Topics    Alcohol use: No    Drug use: No     Review of Systems   Constitutional: Negative for fever.   HENT: Negative for sore throat.    Respiratory: Negative for shortness of breath.    Cardiovascular: Negative for chest pain.   Gastrointestinal: Negative for nausea.   Genitourinary: Negative for dysuria.   Musculoskeletal: Negative for back pain.   Skin: Negative for rash.   Neurological: Positive for headaches. Negative for weakness.   Hematological: Does not bruise/bleed easily.       Physical Exam     Initial Vitals [03/01/22 0804]   BP Pulse Resp Temp SpO2   (!) 146/86 73 18 97.6 °F (36.4 °C) 97 %      MAP       --          Physical Exam    Nursing note and vitals reviewed.  Constitutional: He appears well-developed and well-nourished. He is not diaphoretic. No distress.   HENT:   Head: Normocephalic and atraumatic.   Nose: Nose normal.   Mouth/Throat: Oropharynx is clear and moist. No oropharyngeal exudate.   Eyes: EOM are normal.   Neck: Neck supple. No tracheal deviation present.   Normal range of motion.  Cardiovascular: Normal rate and regular rhythm.   No murmur heard.  Pulmonary/Chest: Breath sounds normal. No stridor. No respiratory distress. He has no rales.   Abdominal: Abdomen is soft. He exhibits no distension and no mass. There is no abdominal tenderness. There is no rebound.   Musculoskeletal:         General: No edema. Normal range of motion.      Cervical back: Normal range of motion and neck supple.     Lymphadenopathy:     He has no cervical adenopathy.   Neurological: He is alert and oriented to person, place, and time. He has normal strength. No sensory deficit. GCS eye subscore is 4. GCS verbal subscore is 5. GCS motor subscore is 6.   Skin: Skin is warm and dry. Capillary refill takes less than 2 seconds. No pallor.   Psychiatric: He has a normal mood and affect.         ED Course   Procedures  Labs Reviewed - No data to display       Imaging Results    None          Medications   dexAMETHasone sodium phos (PF) injection 10 mg (10 mg Intravenous Given 3/1/22 0838)   ketorolac injection 60 mg (60 mg Intramuscular Given 3/1/22 0838)                          Clinical Impression:   Final diagnoses:  [R51.9] Nonintractable headache, unspecified chronicity pattern, unspecified headache type (Primary)  [D86.9] Sarcoidosis          ED Disposition Condition    Discharge Stable        ED Prescriptions     Medication Sig Dispense Start Date End Date Auth. Provider    predniSONE (DELTASONE) 10 MG tablet Take 2 tablets (20 mg total) by mouth once daily. 60 tablet 3/1/2022  Presley Pratt MD        Follow-up  Information    None          Presley Pratt MD  03/03/22 1804       Presley Pratt MD  03/22/22 2551

## 2022-06-30 ENCOUNTER — HOSPITAL ENCOUNTER (OUTPATIENT)
Dept: RADIOLOGY | Facility: HOSPITAL | Age: 42
Discharge: HOME | End: 2022-06-30
Payer: COMMERCIAL

## 2022-06-30 DIAGNOSIS — D86.0 SARCOIDOSIS OF LUNG: ICD-10-CM

## 2022-06-30 DIAGNOSIS — R06.02 SOB (SHORTNESS OF BREATH): ICD-10-CM

## 2022-07-15 DIAGNOSIS — M25.512 LEFT SHOULDER PAIN: Primary | ICD-10-CM
